# Patient Record
Sex: FEMALE | Race: BLACK OR AFRICAN AMERICAN | NOT HISPANIC OR LATINO | URBAN - METROPOLITAN AREA
[De-identification: names, ages, dates, MRNs, and addresses within clinical notes are randomized per-mention and may not be internally consistent; named-entity substitution may affect disease eponyms.]

---

## 2018-08-14 ENCOUNTER — EMERGENCY (EMERGENCY)
Facility: HOSPITAL | Age: 28
LOS: 1 days | Discharge: HOME | End: 2018-08-14
Attending: EMERGENCY MEDICINE | Admitting: EMERGENCY MEDICINE
Payer: SELF-PAY

## 2018-08-14 VITALS
TEMPERATURE: 98 F | OXYGEN SATURATION: 100 % | RESPIRATION RATE: 18 BRPM | DIASTOLIC BLOOD PRESSURE: 75 MMHG | SYSTOLIC BLOOD PRESSURE: 116 MMHG | HEART RATE: 76 BPM

## 2018-08-14 DIAGNOSIS — R09.81 NASAL CONGESTION: ICD-10-CM

## 2018-08-14 DIAGNOSIS — R05 COUGH: ICD-10-CM

## 2018-08-14 PROCEDURE — 71046 X-RAY EXAM CHEST 2 VIEWS: CPT | Mod: 26

## 2018-08-14 RX ORDER — ALBUTEROL 90 UG/1
2 AEROSOL, METERED ORAL
Qty: 6.7 | Refills: 0 | OUTPATIENT
Start: 2018-08-14 | End: 2018-08-20

## 2018-08-14 RX ORDER — ALBUTEROL 90 UG/1
2.5 AEROSOL, METERED ORAL ONCE
Qty: 0 | Refills: 0 | Status: COMPLETED | OUTPATIENT
Start: 2018-08-14 | End: 2018-08-14

## 2018-08-14 RX ADMIN — ALBUTEROL 2.5 MILLIGRAM(S): 90 AEROSOL, METERED ORAL at 15:26

## 2018-08-14 NOTE — ED ADULT NURSE NOTE - NSIMPLEMENTINTERV_GEN_ALL_ED
Implemented All Universal Safety Interventions:  [place] to call system. Call bell, personal items and telephone within reach. Instruct patient to call for assistance. Room bathroom lighting operational. Non-slip footwear when patient is off stretcher. Physically safe environment: no spills, clutter or unnecessary equipment. Stretcher in lowest position, wheels locked, appropriate side rails in place.

## 2018-08-14 NOTE — ED ADULT TRIAGE NOTE - CHIEF COMPLAINT QUOTE
pt. c/o cough and chest congestion. Pt. reports chest discomfort when coughing and lying flat x5days

## 2018-08-14 NOTE — ED PROVIDER NOTE - MEDICAL DECISION MAKING DETAILS
27F with concern for nasal congestion, cough, CXR shows no e/o PNA, no ptx, vital signs wnl, pt encouraged to take sudafed, albuterol for cough, no chest pain perc neg d/c'd home with strict return precautions

## 2018-08-14 NOTE — ED ADULT TRIAGE NOTE - SPO2 (%)
Medical Necessity Information: It is in your best interest to select a reason for this procedure from the list below. All of these items fulfill various CMS LCD requirements except the new and changing color options. Medical Necessity Clause: This procedure was medically necessary because the lesions that were treated were: Number Of Freeze-Thaw Cycles: 1 freeze-thaw cycle Include Z78.9 (Other Specified Conditions Influencing Health Status) As An Associated Diagnosis?: Yes Detail Level: Detailed Post-Care Instructions: I reviewed with the patient in detail post-care instructions. Patient is to wear sunprotection, and avoid picking at any of the treated lesions. Pt may apply Vaseline to crusted or scabbing areas. Render Post-Care Instructions In Note?: no Consent: The patient's consent was obtained including but not limited to risks of crusting, scabbing, blistering, scarring, darker or lighter pigmentary change, recurrence, incomplete removal and infection. 100

## 2018-08-14 NOTE — ED ADULT NURSE NOTE - CHPI ED NUR SYMPTOMS NEG
no body aches/no headache/no chest pain/no chills/no diaphoresis/no fever/no wheezing/no edema/no shortness of breath/no hemoptysis

## 2018-08-14 NOTE — ED PROVIDER NOTE - OBJECTIVE STATEMENT
27F with nasal congestion, cough no other complaints, no chest pain no sob no past medical hx pt is otherwise well no leg swelling.

## 2022-05-03 NOTE — ED ADULT NURSE NOTE - BREATH SOUNDS, MLM
[FreeTextEntry1] : referred by Bariatric surgeon, or his Nurse practitioner.\par \par the Bariatric surgeon is Dr Siegel, [facility]..\par \par had a duodenal switch surgery\par \par having a problem with Diestasis recti\par having gerd..\par \par getting heartburn and regurgitation, and vomiting\par sometimes diarrhea..\par never had a colonoscopy and is supposed to have colonoscopy and endo\par \par patient was therefore referred here \par has several bms per day, five or six\par \par has to strain..\par never had colonoscopy\par this has been her bowel pattern 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